# Patient Record
Sex: FEMALE | Race: WHITE | Employment: OTHER | URBAN - NONMETROPOLITAN AREA
[De-identification: names, ages, dates, MRNs, and addresses within clinical notes are randomized per-mention and may not be internally consistent; named-entity substitution may affect disease eponyms.]

---

## 2022-12-04 ENCOUNTER — OFFICE VISIT (OUTPATIENT)
Age: 22
End: 2022-12-04

## 2022-12-04 VITALS
HEART RATE: 110 BPM | DIASTOLIC BLOOD PRESSURE: 72 MMHG | BODY MASS INDEX: 21.98 KG/M2 | WEIGHT: 145 LBS | SYSTOLIC BLOOD PRESSURE: 114 MMHG | HEIGHT: 68 IN | TEMPERATURE: 97.4 F | RESPIRATION RATE: 18 BRPM | OXYGEN SATURATION: 97 %

## 2022-12-04 DIAGNOSIS — N76.4 ABSCESS, VULVA: Primary | ICD-10-CM

## 2022-12-04 RX ORDER — SULFAMETHOXAZOLE AND TRIMETHOPRIM 800; 160 MG/1; MG/1
TABLET ORAL
COMMUNITY
Start: 2022-11-17

## 2022-12-04 RX ORDER — CEPHALEXIN 500 MG/1
500 CAPSULE ORAL 4 TIMES DAILY
Qty: 40 CAPSULE | Refills: 0 | Status: SHIPPED | OUTPATIENT
Start: 2022-12-04

## 2022-12-04 RX ORDER — NORGESTIMATE AND ETHINYL ESTRADIOL 0.25-0.035
KIT ORAL
COMMUNITY
Start: 2022-09-24

## 2022-12-04 RX ORDER — DOXYCYCLINE 100 MG/1
CAPSULE ORAL
COMMUNITY
Start: 2022-11-22

## 2022-12-04 ASSESSMENT — ENCOUNTER SYMPTOMS
SINUS PAIN: 0
SORE THROAT: 0
SINUS PRESSURE: 0
EYE PAIN: 0
SHORTNESS OF BREATH: 0
DIARRHEA: 0
ALLERGIC/IMMUNOLOGIC NEGATIVE: 1
ABDOMINAL PAIN: 0
NAUSEA: 0
COUGH: 0
VOMITING: 0

## 2022-12-04 NOTE — PROGRESS NOTES
[id number]  [address] [address] [place] [address]  Dept: [phone]  Dept Fax: [phone]  Loc: [phone]    Marbin Bush is a 20 y.o. female who presents today for her medical conditions/complaints as noted below. Marbin Bush is c/o of Other (Possible vulvar abscess )        HPI:     HPI  Marbin Bush presents with complaints of an abscess to right vulva. Patient reports she just finished Doxycycline on 11/29 for an abscess to her left vulva. Patient has an appointment with an OB/GYN specialist in [facility] next week. Symptoms began 2 days ago. OTC treatment includes Ibuprofen. Denies recent antibiotics and steroids. Denies recent covid19 infection. Past Medical History:   Diagnosis Date    Vulvar abscess      Past Surgical History:   Procedure Laterality Date    TONSILLECTOMY         History reviewed. No pertinent family history. Social History     Tobacco Use    Smoking status: Never    Smokeless tobacco: Never   Substance Use Topics    Alcohol use: Yes     Comment: occ      Current Outpatient Medications   Medication Sig Dispense Refill    ESTARYLLA 0.25-35 MG-MCG per tablet TAKE 1 TABLET BY MOUTH EVERY DAY      cephALEXin (KEFLEX) 500 MG capsule Take 1 capsule by mouth 4 times daily 40 capsule 0    sulfamethoxazole-trimethoprim (BACTRIM DS;SEPTRA DS) 800-160 MG per tablet TAKE 1 TABLET BY MOUTH TWICE DAILY FOR 7 DAYS (Patient not taking: Reported on 12/4/2022)      doxycycline monohydrate (MONODOX) 100 MG capsule TAKE 1 CAPSULE BY MOUTH TWICE DAILY FOR 7 DAYS (Patient not taking: Reported on 12/4/2022)       No current facility-administered medications for this visit.      No Known Allergies    Health Maintenance   Topic Date Due    COVID-19 Vaccine (1) Never done    Varicella vaccine (1 of 2 - 2-dose childhood series) Never done    HPV vaccine (1 - 2-dose series) Never done    Depression Screen  Never done    HIV screen Never done    Chlamydia/GC screen  Never done    Hepatitis C screen  Never done    DTaP/Tdap/Td vaccine (1 - Tdap) Never done    Pap smear  Never done    Flu vaccine (1) Never done    Hepatitis A vaccine  Aged Out    Hib vaccine  Aged Out    Meningococcal (ACWY) vaccine  Aged Out    Pneumococcal 0-64 years Vaccine  Aged Out       Subjective:     Review of Systems   Constitutional:  Negative for chills, fatigue and fever. HENT:  Negative for congestion, postnasal drip, sinus pressure, sinus pain and sore throat. Eyes:  Negative for pain and visual disturbance. Respiratory:  Negative for cough and shortness of breath. Cardiovascular:  Negative for chest pain. Gastrointestinal:  Negative for abdominal pain, diarrhea, nausea and vomiting. Endocrine: Negative for cold intolerance and heat intolerance. Genitourinary:  Negative for frequency, hematuria and urgency. Pain and swelling to right vulva. Musculoskeletal:  Negative for myalgias. Skin:  Negative for rash. Allergic/Immunologic: Negative. Neurological:  Negative for weakness, light-headedness and headaches. Hematological: Negative. Psychiatric/Behavioral: Negative.       :Objective      Physical Exam  Constitutional:       Appearance: Normal appearance. HENT:      Head: Normocephalic and atraumatic. Right Ear: Tympanic membrane, ear canal and external ear normal.      Left Ear: Tympanic membrane, ear canal and external ear normal.      Nose: Nose normal.      Mouth/Throat:      Mouth: Mucous membranes are moist.   Eyes:      General:         Right eye: No discharge. Left eye: No discharge. Conjunctiva/sclera: Conjunctivae normal.   Cardiovascular:      Rate and Rhythm: Normal rate and regular rhythm. Pulmonary:      Effort: Pulmonary effort is normal. No respiratory distress. Abdominal:      General: Abdomen is flat. Palpations: Abdomen is soft.    Genitourinary:         Comments: Tenderness, erythema, and swelling to right vulva. No drainage noted. Musculoskeletal:         General: Normal range of motion. Cervical back: Normal range of motion. Skin:     General: Skin is warm and dry. Capillary Refill: Capillary refill takes less than 2 seconds. Neurological:      General: No focal deficit present. Mental Status: She is alert. Psychiatric:         Mood and Affect: Mood normal.     /72   Pulse (!) 110   Temp 97.4 °F (36.3 °C)   Resp 18   Ht 5' 8\" (1.727 m)   Wt 145 lb (65.8 kg)   SpO2 97%   BMI 22.05 kg/m²     :Assessment       Diagnosis Orders   1. Abscess, vulva  cephALEXin (KEFLEX) 500 MG capsule          :Plan   1. Take antibiotics as directed  2. Tylenol/Ibuprofen as needed for pain. 3. Keep area clean and dry  4. Warm epsom salt soaks every 2 hours while awake for 2 days- using wash basin, warm moist washcloth, or in hot shower. At least 20 minutes to encourage drainage of wound  5. Increase fluids- especially water while on antibiotics  6. Monitor for spreading redness, new fever or nausea. Seek care if they occur  7. Keep appointment with OB/GYN specialist next week. Return precautions and home care education completed. Patient verbalized understanding. No orders of the defined types were placed in this encounter. No results found for this visit on 12/04/22. Return if symptoms worsen or fail to improve. Orders Placed This Encounter   Medications    cephALEXin (KEFLEX) 500 MG capsule     Sig: Take 1 capsule by mouth 4 times daily     Dispense:  40 capsule     Refill:  0         Patient given educational materials- see patient instructions. Discussed use, benefit, and side effects of prescribed medications. All patient questions answered. Pt voiced understanding. Patient Instructions   1. Take antibiotics as directed  2. Tylenol/Ibuprofen as needed for pain. 3. Keep area clean and dry  4.  Warm epsom salt soaks every 2 hours while awake for 2 days- using wash basin, warm moist washcloth, or in hot shower. At least 20 minutes to encourage drainage of wound  5. Increase fluids- especially water while on antibiotics  6. Monitor for spreading redness, new fever or nausea. Seek care if they occur  7. Keep appointment with OB/GYN specialist next week. 8. If not improving or the area is worsening, return for evaluation.       Electronically signed by NATHALIE Connor CNP on 12/4/2022 at 10:02 AM

## 2022-12-04 NOTE — PATIENT INSTRUCTIONS
1. Take antibiotics as directed  2. Tylenol/Ibuprofen as needed for pain. 3. Keep area clean and dry  4. Warm epsom salt soaks every 2 hours while awake for 2 days- using wash basin, warm moist washcloth, or in hot shower. At least 20 minutes to encourage drainage of wound  5. Increase fluids- especially water while on antibiotics  6. Monitor for spreading redness, new fever or nausea. Seek care if they occur  7. Keep appointment with OB/GYN specialist next week. 8. If not improving or the area is worsening, return for evaluation.

## 2024-03-26 ENCOUNTER — TELEPHONE (OUTPATIENT)
Dept: FAMILY MEDICINE CLINIC | Facility: CLINIC | Age: 24
End: 2024-03-26
Payer: OTHER GOVERNMENT

## 2024-03-26 ENCOUNTER — OFFICE VISIT (OUTPATIENT)
Dept: FAMILY MEDICINE CLINIC | Facility: CLINIC | Age: 24
End: 2024-03-26
Payer: OTHER GOVERNMENT

## 2024-03-26 VITALS
SYSTOLIC BLOOD PRESSURE: 132 MMHG | BODY MASS INDEX: 22.28 KG/M2 | HEIGHT: 68 IN | HEART RATE: 83 BPM | OXYGEN SATURATION: 99 % | WEIGHT: 147 LBS | DIASTOLIC BLOOD PRESSURE: 87 MMHG

## 2024-03-26 DIAGNOSIS — L70.8 OTHER ACNE: Primary | ICD-10-CM

## 2024-03-26 PROCEDURE — 96127 BRIEF EMOTIONAL/BEHAV ASSMT: CPT | Performed by: NURSE PRACTITIONER

## 2024-03-26 PROCEDURE — 99203 OFFICE O/P NEW LOW 30 MIN: CPT | Performed by: NURSE PRACTITIONER

## 2024-03-26 RX ORDER — SPIRONOLACTONE 50 MG/1
1 TABLET, FILM COATED ORAL DAILY
COMMUNITY
Start: 2023-12-28

## 2024-03-26 RX ORDER — NORGESTIMATE AND ETHINYL ESTRADIOL 0.25-0.035
KIT ORAL
COMMUNITY
Start: 2017-02-01

## 2024-03-26 NOTE — TELEPHONE ENCOUNTER
Genesis called about her dermatology referral. [facility] is out of network. She would like to be referred to [facility]. She says that Dr. Josep Mariano is in network.

## 2024-03-26 NOTE — PROGRESS NOTES
"Chief Complaint  Establish Care    Subjective    History of Present Illness      Patient presents to [facility] for   History of Present Illness  Pt presents today to establish care. Pt wanting referral to dermatologist for acne        Review of Systems    I have reviewed and agree with the HPI information as above.  Debbi Whitley, APRN     Objective   Vital Signs:   /87   Pulse 83   Ht 172.7 cm (68\")   Wt 66.7 kg (147 lb)   SpO2 99%   BMI 22.35 kg/m²     BMI is within normal parameters. No other follow-up for BMI required.      Physical Exam  Vitals and nursing note reviewed.   Constitutional:       Appearance: Normal appearance. She is well-developed.   HENT:      Head: Normocephalic and atraumatic.      Right Ear: Tympanic membrane, ear canal and external ear normal.      Left Ear: Tympanic membrane, ear canal and external ear normal.      Nose: Nose normal. No septal deviation, nasal tenderness or congestion.      Mouth/Throat:      Lips: Pink. No lesions.      Mouth: Mucous membranes are moist. No oral lesions.      Dentition: Normal dentition.      Pharynx: Oropharynx is clear. No pharyngeal swelling, oropharyngeal exudate or posterior oropharyngeal erythema.   Eyes:      General: Lids are normal. Vision grossly intact. No scleral icterus.        Right eye: No discharge.         Left eye: No discharge.      Extraocular Movements: Extraocular movements intact.      Conjunctiva/sclera: Conjunctivae normal.      Right eye: Right conjunctiva is not injected.      Left eye: Left conjunctiva is not injected.      Pupils: Pupils are equal, round, and reactive to light.   Neck:      Thyroid: No thyroid mass.      Trachea: Trachea normal.   Cardiovascular:      Rate and Rhythm: Normal rate and regular rhythm.      Heart sounds: Normal heart sounds. No murmur heard.     No gallop.   Pulmonary:      Effort: Pulmonary effort is normal.      Breath sounds: Normal breath " sounds and air entry. No wheezing, rhonchi or rales.   Musculoskeletal:         General: No tenderness or deformity. Normal range of motion.      Cervical back: Full passive range of motion without pain, normal range of motion and neck supple.      Thoracic back: Normal.      Right lower leg: No edema.      Left lower leg: No edema.   Skin:     General: Skin is warm and dry.      Coloration: Skin is not jaundiced.      Findings: No rash.   Neurological:      Mental Status: She is alert and oriented to person, place, and time.      Sensory: Sensation is intact.      Motor: Motor function is intact.      Coordination: Coordination is intact.      Gait: Gait is intact.      Deep Tendon Reflexes: Reflexes are normal and symmetric.   Psychiatric:         Mood and Affect: Mood and affect normal.         Behavior: Behavior normal.         Judgment: Judgment normal.          KARTHIK-7: Over the last two weeks, how often have you been bothered by the following problems?  Feeling nervous, anxious or on edge: Not at all  Not being able to stop or control worrying: Not at all  Worrying too much about different things: Not at all  Trouble Relaxing: Not at all  Being so restless that it is hard to sit still: Not at all  Becoming easily annoyed or irritable: Not at all  Feeling afraid as if something awful might happen: Not at all  KARTHIK 7 Total Score: 0  If you checked any problems, how difficult have these problems made it for you to do your work, take care of things at home, or get along with other people: Not difficult at all    PHQ-2 Depression Screening  Little interest or pleasure in doing things? 0-->not at all   Feeling down, depressed, or hopeless? 0-->not at all   PHQ-2 Total Score 0     PHQ-9 Depression Screening  Little interest or pleasure in doing things? 0-->not at all   Feeling down, depressed, or hopeless? 0-->not at all   Trouble falling or staying asleep, or sleeping too much?     Feeling tired or having little  energy?     Poor appetite or overeating?     Feeling bad about yourself - or that you are a failure or have let yourself or your family down?     Trouble concentrating on things, such as reading the newspaper or watching television?     Moving or speaking so slowly that other people could have noticed? Or the opposite - being so fidgety or restless that you have been moving around a lot more than usual?     Thoughts that you would be better off dead, or of hurting yourself in some way?     PHQ-9 Total Score 0   If you checked off any problems, how difficult have these problems made it for you to do your work, take care of things at home, or get along with other people?        Result Review  Data Reviewed:                   Assessment and Plan      Diagnoses and all orders for this visit:    1. Other acne (Primary)  -     Ambulatory Referral to Dermatology    Patient is newer to this area.  She is wanting to get established with a PCP such that she could be referred to dermatology.  Patient is wanting referral to dermatology for her acne.  She has tried topicals, oral antibiotics, and now spironolactone without any real leaf from her acne.  Will refer to Kiesha Stephenson at this time.        Follow Up   Return if symptoms worsen or fail to improve.  Patient was given instructions and counseling regarding her condition or for health maintenance advice. Please see specific information pulled into the AVS if appropriate.

## 2024-03-27 ENCOUNTER — PATIENT ROUNDING (BHMG ONLY) (OUTPATIENT)
Dept: FAMILY MEDICINE CLINIC | Facility: CLINIC | Age: 24
End: 2024-03-27
Payer: OTHER GOVERNMENT

## 2024-03-27 NOTE — PROGRESS NOTES
March 27, 2024    Hello, may I speak with Genesis Jay?    My name is Sanna      I am  with [facility]  [facility]  [address] [place] [address] [phone].    Before we get started may I verify your date of birth? 2000    I am calling to officially welcome you to our practice and ask about your recent visit. Is this a good time to talk? yes    Tell me about your visit with us. What things went well?  It was a quick check in. I was just there to establish. Everything was good.       We're always looking for ways to make our patients' experiences even better. Do you have recommendations on ways we may improve?  no    Overall were you satisfied with your first visit to our practice? yes       I appreciate you taking the time to speak with me today. Is there anything else I can do for you? Well, not sure if this is anything that you can help me with me, but I did the online check in and it was pretty lengthy. That would be the only thing I wish was a little quicker and less lengthy.      Thank you, and have a great day.

## 2024-04-01 ENCOUNTER — TELEPHONE (OUTPATIENT)
Dept: FAMILY MEDICINE CLINIC | Facility: CLINIC | Age: 24
End: 2024-04-01

## 2024-04-01 NOTE — TELEPHONE ENCOUNTER
Caller: Genesis Jay    Relationship: Self    Best call back number: [phone]     What medication are you requesting: TRETINOIN      What are your current symptoms: CYSTIC ACNE     How long have you been experiencing symptoms: SEVERAL YEARS     Have you had these symptoms before:    [x] Yes  [] No    Have you been treated for these symptoms before:   [x] Yes  [] No    If a prescription is needed, what is your preferred pharmacy and phone number: [id number] - [facility] [place] - [address] [facility] [phone] [facility] [phone]      Additional notes:    PLEASE FOLLOW-UP WITH PATIENT REGARDING THIS REQUEST.

## 2024-04-03 DIAGNOSIS — L70.8 OTHER ACNE: Primary | ICD-10-CM

## 2024-04-03 RX ORDER — TRETINOIN 1 MG/G
1 CREAM TOPICAL NIGHTLY
Qty: 45 G | Refills: 5 | Status: SHIPPED | OUTPATIENT
Start: 2024-04-03

## 2025-01-15 RX ORDER — NORGESTIMATE AND ETHINYL ESTRADIOL 0.25-0.035
1 KIT ORAL DAILY
Qty: 90 TABLET | Refills: 3 | Status: SHIPPED | OUTPATIENT
Start: 2025-01-15 | End: 2025-04-15